# Patient Record
Sex: MALE | Race: WHITE
[De-identification: names, ages, dates, MRNs, and addresses within clinical notes are randomized per-mention and may not be internally consistent; named-entity substitution may affect disease eponyms.]

---

## 2020-02-17 LAB
BUN BLD-MCNC: 19 MG/DL (ref 7–18)
GLUCOSE SERPLBLD-MCNC: 85 MG/DL (ref 74–106)
HCT VFR BLD CALC: 44.6 % (ref 39.6–49)
INR BLD: 1.05
LYMPHOCYTES # SPEC AUTO: 2 K/UL (ref 0.7–4.9)
PMV BLD: 8.1 FL (ref 7.6–11.3)
POTASSIUM SERPL-SCNC: 4.3 MMOL/L (ref 3.5–5.1)
RBC # BLD: 4.97 M/UL (ref 4.33–5.43)

## 2020-02-17 NOTE — RAD REPORT
EXAM DESCRIPTION:  RAD - Chest Pa And Lat (2 Views) - 2/17/2020 4:17 pm

 

CLINICAL HISTORY:  preop cath lab

Chest pain.

 

COMPARISON:  Chest Pa And Lat (2 Views) dated 8/3/2016; CHEST PA AND LAT 2 VIEW dated 9/27/2010

 

TECHNIQUE:  PA and lateral views of the chest were obtained.

 

FINDINGS:  The lungs are hyperexpanded compatible with COPD. The heart is upper limit of normal in si
ze. No fracture or aggressive bony process.

 

IMPRESSION:  COPD without acute process identified.

## 2020-02-18 NOTE — EKG
Test Date:    2020-02-17               Test Time:    15:26:47

Technician:   PALOMO                                   

                                                     

MEASUREMENT RESULTS:                                       

Intervals:                                           

Rate:         45                                     

MI:           224                                    

QRSD:         82                                     

QT:           428                                    

QTc:          370                                    

Axis:                                                

P:            34                                     

MI:           224                                    

QRS:          -33                                    

T:            24                                     

                                                     

INTERPRETIVE STATEMENTS:                                       

                                                     

Marked sinus bradycardia with 1st degree AV block

Left axis deviation

Abnormal ECG

Compared to ECG 06/20/2003 05:18:00

First degree AV block now present

Myocardial infarct finding no longer present



Electronically Signed On 02-18-20 15:25:01 CST by Mark Hermosillo

## 2020-02-20 ENCOUNTER — HOSPITAL ENCOUNTER (OUTPATIENT)
Dept: HOSPITAL 97 - OR | Age: 80
Discharge: HOME | End: 2020-02-20
Attending: INTERNAL MEDICINE
Payer: COMMERCIAL

## 2020-02-20 VITALS — DIASTOLIC BLOOD PRESSURE: 53 MMHG | SYSTOLIC BLOOD PRESSURE: 118 MMHG

## 2020-02-20 VITALS — TEMPERATURE: 97.8 F

## 2020-02-20 VITALS — OXYGEN SATURATION: 96 %

## 2020-02-20 DIAGNOSIS — Z88.0: ICD-10-CM

## 2020-02-20 DIAGNOSIS — I25.10: Primary | ICD-10-CM

## 2020-02-20 DIAGNOSIS — I25.82: ICD-10-CM

## 2020-02-20 DIAGNOSIS — I10: ICD-10-CM

## 2020-02-20 PROCEDURE — 93458 L HRT ARTERY/VENTRICLE ANGIO: CPT

## 2020-02-20 PROCEDURE — 80048 BASIC METABOLIC PNL TOTAL CA: CPT

## 2020-02-20 PROCEDURE — 85025 COMPLETE CBC W/AUTO DIFF WBC: CPT

## 2020-02-20 PROCEDURE — 85610 PROTHROMBIN TIME: CPT

## 2020-02-20 PROCEDURE — 36415 COLL VENOUS BLD VENIPUNCTURE: CPT

## 2020-02-20 PROCEDURE — 71046 X-RAY EXAM CHEST 2 VIEWS: CPT

## 2020-02-20 PROCEDURE — 85730 THROMBOPLASTIN TIME PARTIAL: CPT

## 2020-02-20 PROCEDURE — 93005 ELECTROCARDIOGRAM TRACING: CPT

## 2020-02-20 NOTE — OP
Surgeon:  Mark Hermosillo MD



Assistant:  Ms. Bañuelos. 



Patient will probably go home today and have an appointment with Cardiovascular Surgery in [place] in
 the near future.  The films will be given to him directly.



Reason For Admission:  Left heart catheterization with selective coronary arteriogram and left ventri
culogram.



Indication:  Chest pain, CAD, and positive stress test.



Description Of Procedure:  Patient was prepped and draped in the routine sterile fashion.  Given Vers
 for sedation.  Right common femoral artery access with a 6-[place] sheath obtained.  Angiography th
ere showed a patent iliac artery.  Star[employer] was used to close the case.  Nohemi catheter, 6-[place]an,
 left and right were used respectively for the left main and the right main.  The RCA was subtotaled 
with CARLTON 1 flow.  Collaterals still came from the LAD, septal .  His left main was normal 
and circumflex had mild to moderate plaquing.  LAD had a 99% occlusion from the mid LAD right after t
he 1st diagonal all the way down to the distal LAD.  The vessel probably appeared to be about 2 mm wi
de; however, I think this is because the flow is so poor.  LV gram was done showing normal ejection f
raction, no wall motion abnormalities, and an end-diastolic pressure of 5 mmHg.  There were no compli
cations.



Blood Loss:  5 mL.



Postoperative Diagnosis:  Severe coronary artery disease. 



Plan is for coronary artery bypass graft, probably a left internal mammary artery to the left anterio
r descending and a vein graft to the posterior descending artery off the RCA.





NB/BONNIE

DD:  02/20/2020 07:58:48Voice ID:  [id number]

DT:  02/20/2020 18:43:32Report ID:  [id number]